# Patient Record
Sex: FEMALE | Race: BLACK OR AFRICAN AMERICAN | URBAN - METROPOLITAN AREA
[De-identification: names, ages, dates, MRNs, and addresses within clinical notes are randomized per-mention and may not be internally consistent; named-entity substitution may affect disease eponyms.]

---

## 2019-11-01 ENCOUNTER — MEDICAL CORRESPONDENCE (OUTPATIENT)
Dept: HEALTH INFORMATION MANAGEMENT | Facility: CLINIC | Age: 29
End: 2019-11-01

## 2019-11-25 ENCOUNTER — MEDICAL CORRESPONDENCE (OUTPATIENT)
Dept: HEALTH INFORMATION MANAGEMENT | Facility: CLINIC | Age: 29
End: 2019-11-25

## 2019-11-25 ENCOUNTER — TRANSFERRED RECORDS (OUTPATIENT)
Dept: HEALTH INFORMATION MANAGEMENT | Facility: CLINIC | Age: 29
End: 2019-11-25

## 2019-11-26 ENCOUNTER — MEDICAL CORRESPONDENCE (OUTPATIENT)
Dept: HEALTH INFORMATION MANAGEMENT | Facility: CLINIC | Age: 29
End: 2019-11-26

## 2019-11-26 DIAGNOSIS — L50.8 AQUAGENIC URTICARIA: ICD-10-CM

## 2019-12-30 ENCOUNTER — INFUSION THERAPY VISIT (OUTPATIENT)
Dept: INFUSION THERAPY | Facility: CLINIC | Age: 29
End: 2019-12-30
Attending: ALLERGY & IMMUNOLOGY
Payer: COMMERCIAL

## 2019-12-30 VITALS
HEART RATE: 103 BPM | DIASTOLIC BLOOD PRESSURE: 78 MMHG | TEMPERATURE: 98.3 F | OXYGEN SATURATION: 95 % | SYSTOLIC BLOOD PRESSURE: 131 MMHG

## 2019-12-30 DIAGNOSIS — L50.8 AQUAGENIC URTICARIA: Primary | ICD-10-CM

## 2019-12-30 PROCEDURE — 25000128 H RX IP 250 OP 636: Mod: ZF | Performed by: ALLERGY & IMMUNOLOGY

## 2019-12-30 PROCEDURE — 96372 THER/PROPH/DIAG INJ SC/IM: CPT

## 2019-12-30 RX ORDER — CETIRIZINE HYDROCHLORIDE 10 MG/1
20 TABLET ORAL DAILY
COMMUNITY

## 2019-12-30 RX ORDER — HYDROXYZINE PAMOATE 25 MG/1
25 CAPSULE ORAL DAILY
COMMUNITY

## 2019-12-30 RX ORDER — MONTELUKAST SODIUM 10 MG/1
10 TABLET ORAL DAILY
COMMUNITY

## 2019-12-30 RX ADMIN — OMALIZUMAB 300 MG: 150 INJECTION, SOLUTION SUBCUTANEOUS at 15:17

## 2019-12-30 ASSESSMENT — PAIN SCALES - GENERAL: PAINLEVEL: NO PAIN (0)

## 2019-12-30 NOTE — PROGRESS NOTES
Patient presents to the [facility] for Xolair injection 1st dose.  Order written by Dr. Jolly was completed today. Name and  verified with patient. See MAR for medication details. Medication was divided into 2 syringes by pharmacy and given in the following sites back side of left upper arm. Patient tolerated injection well and was monitored for 2 hours after administration. Patient was discharged to home. Patient to return back in 4 weeks. Report given to IVETT Gauthier RN at 430 pm.  Care transferred at that time.      STEF Mcgee LPN    Administrations This Visit     omalizumab (XOLAIR) injection 300 mg     Admin Date  2019 Action  Given Dose  300 mg Route  Subcutaneous Administered By  Earlene Mcgee LPN

## 2019-12-30 NOTE — PATIENT INSTRUCTIONS
Patient Education     Omalizumab Solution for injection  What is this medicine?  OMALIZUMAB (oh renetta lye ZOO mab) is used to help treat allergic asthma and other allergies. It may be given with other treatments for these conditions.  This medicine may be used for other purposes; ask your health care provider or pharmacist if you have questions.  What should I tell my health care provider before I take this medicine?  They need to know if you have any of these conditions:    lymphoma or other cancer    an unusual or allergic reaction to omalizumab, hamster proteins, other medicines, foods, dyes, or preservatives    pregnant or trying to get pregnant    breast-feeding  How should I use this medicine?  This medicine is for injection under the skin. It is given by a health care professional in a hospital or clinic setting.  A special MedGuide will be given to you with each injection. Be sure to read this information carefully each time.  Talk to your pediatrician regarding the use of this medicine in children. While this drug may be prescribed for children for selected conditions, precautions do apply.  Overdosage: If you think you have taken too much of this medicine contact a poison control center or emergency room at once.  NOTE: This medicine is only for you. Do not share this medicine with others.  What if I miss a dose?  It is important not to miss your dose. This medicine is usually given every 2 to 4 weeks. Call your doctor or health care professional if you are unable to keep an appointment.  What may interact with this medicine?  Interactions are not expected.  This list may not describe all possible interactions. Give your health care provider a list of all the medicines, herbs, non-prescription drugs, or dietary supplements you use. Also tell them if you smoke, drink alcohol, or use illegal drugs. Some items may interact with your medicine.  What should I watch for while using this medicine?  After  receiving each injection, you will be monitored for a short time in your doctor's office or clinic.  Improvement of your asthma or allergies will not be immediate. Improvements will occur gradually.  You will have regular blood tests to determine how often you will need this medicine.  Do not stop taking any of your other asthma or allergy treatments unless otherwise directed by your doctor.  What side effects may I notice from receiving this medicine?  Side effects that you should report to your doctor or health care professional as soon as possible:    allergic reactions like skin rash, itching or hives, swelling of the face, lips, or tongue    breathing problems    pain, tingling, numbness in the hands or feet    unusual bleeding or bruising  Side effects that usually do not require medical attention (report to your doctor or health care professional if they continue or are bothersome):    headache    mild redness, itching, swelling, or bruising at the injection site    nausea  This list may not describe all possible side effects. Call your doctor for medical advice about side effects. You may report side effects to FDA at 4-132-FDA-9542.  Where should I keep my medicine?  This drug is given in a hospital or clinic and will not be stored at home.  NOTE:This sheet is a summary. It may not cover all possible information. If you have questions about this medicine, talk to your doctor, pharmacist, or health care provider. Copyright  2016 [employer]

## 2019-12-30 NOTE — PROGRESS NOTES
General assessment for IV and SQ medications    1. Elevated temperature, fever, chills, productive cough or abnormal vital signs, night sweats, coughing up blood or sputum, no appetite or abnormal vital signs, SOB : NO    2. Open wounds or new incisions: NO    3. Recent hospitalization: NO    4.  Recent surgeries:  NO    5. Any upcoming surgeries or dental procedures?:NO    6. Any current or recent bouts of illness or infection? On any antibiotics? : NO    7. Any new, sudden or worsening abdominal pain :NO    8. Vaccination within 4 weeks? Patient or someone in the household is scheduled to receive vaccination? No live virus vaccines prior to or during treatment :NO    9. Any nervous system diseases [i.e. multiple sclerosis, Guillain-[place], seizures, neurological  Changes]  Ask if on a biologic medication* : NO    10. Pregnant or breast feeding; or plans on pregnancy in the future: NO    11. Signs of worsening depression or suicidal ideations while taking benlysta:NO    12. New-onset medical symptoms: NO    13.  New cancer diagnosis or on chemotherapy or radiation NO    14.  Evaluate for any sign of active TB [Unexplained weight loss, Loss of appetite, Night sweats, Fever, Fatigue, Chills, Coughing for 3 weeks or longer, Hemoptysis (coughing up blood), Chest pain]: NO            **Note: If answered yes to any of the above, hold the infusion and contact ordering provider.

## 2020-01-27 ENCOUNTER — INFUSION THERAPY VISIT (OUTPATIENT)
Dept: INFUSION THERAPY | Facility: CLINIC | Age: 30
End: 2020-01-27
Attending: ALLERGY & IMMUNOLOGY
Payer: COMMERCIAL

## 2020-01-27 VITALS
SYSTOLIC BLOOD PRESSURE: 137 MMHG | DIASTOLIC BLOOD PRESSURE: 67 MMHG | OXYGEN SATURATION: 97 % | HEART RATE: 90 BPM | TEMPERATURE: 98.5 F

## 2020-01-27 DIAGNOSIS — L50.8 AQUAGENIC URTICARIA: Primary | ICD-10-CM

## 2020-01-27 PROCEDURE — 96372 THER/PROPH/DIAG INJ SC/IM: CPT

## 2020-01-27 PROCEDURE — 25000128 H RX IP 250 OP 636: Mod: ZF | Performed by: ALLERGY & IMMUNOLOGY

## 2020-01-27 RX ADMIN — OMALIZUMAB 300 MG: 150 INJECTION, SOLUTION SUBCUTANEOUS at 13:45

## 2020-01-27 ASSESSMENT — PAIN SCALES - GENERAL: PAINLEVEL: NO PAIN (0)

## 2020-01-27 NOTE — PROGRESS NOTES
Nursing Note      Treatment Conditions: ~~~ NOTE: If the patient answers yes to any of the questions below, hold the infusion and contact ordering provider or on-call provider.    1. Have you recently had an elevated temperature, fever, chills, productive cough, coughing for 3 weeks or longer or hemoptysis, abnormal vital signs, night sweats,  chest pain or have you noticed a decrease in your appetite, unexplained weight loss or fatigue? No  2. Do you have any open wounds or new incisions? No  3. Do you have any recent or upcoming hospitalizations, surgeries or dental procedures? No  4. Do you currently have or recently have had any signs of illness or infection or are you on any antibiotics? No  5. Have you had any new, sudden or worsening abdominal pain? No  6. Have you or anyone in your household received a live vaccination in the past 4 weeks? Please note:  No live vaccines while on biologic/chemotherapy until 6 months after the last treatment.  Patient can receive the flu vaccine (shot only) and the pneumovax.  It is optimal for the patient to get these vaccines mid cycle, but they can be given at any time as long as it is not on the day of the infusion. No  7. Have you recently been diagnosed with any new nervous system diseases (ie. Multiple sclerosis, Guillain [place], seizures, neurological changes) or cancer diagnosis? No  8. Are you on any form of radiation or chemotherapy? No  9. Are you pregnant or breast feeding or do you have plans of pregnancy in the future? No  10. Have you been having any signs of worsening depression or suicidal ideations?  (benlysta only) No  11. Have there been any other new onset medical symptoms? No      Marianna Faye RN

## 2020-01-27 NOTE — PROGRESS NOTES
Patient presents to the [facility] for Xolair.  Order written by Dr. Jolly was completed today. Name and  verified with patient. See MAR for medication details. Medication was divided into 2 syringes by pharmacy and given in the following sites back side of left upper arm. Patient tolerated injection well and was monitored for 2 hours after administration. Patient was  discharged to home. Patient to return back in 4 weeks.    STEF Mcgee LPN    Administrations This Visit     omalizumab (XOLAIR) injection 300 mg     Admin Date  2020 Action  Given Dose  300 mg Route  Subcutaneous Administered By  Earlene Mcgee LPN

## 2021-02-22 ENCOUNTER — TELEPHONE (OUTPATIENT)
Dept: WOUND CARE | Facility: CLINIC | Age: 31
End: 2021-02-22

## 2021-02-22 NOTE — TELEPHONE ENCOUNTER
New patient who had liposuction on 2/16/21 and has lipo burns and wants to make a new patient appointment.

## 2021-02-22 NOTE — TELEPHONE ENCOUNTER
Returned call to pt. New pt appointment not available until 3/5. Directed pt to call surgeon who performed liposuction to see if they are able to see pt. Pt verbalized understanding.